# Patient Record
Sex: MALE | Race: WHITE | NOT HISPANIC OR LATINO | Employment: UNEMPLOYED | URBAN - NONMETROPOLITAN AREA
[De-identification: names, ages, dates, MRNs, and addresses within clinical notes are randomized per-mention and may not be internally consistent; named-entity substitution may affect disease eponyms.]

---

## 2018-08-19 ENCOUNTER — HOSPITAL ENCOUNTER (EMERGENCY)
Facility: HOSPITAL | Age: 38
Discharge: HOME | End: 2018-08-19
Attending: EMERGENCY MEDICINE | Admitting: EMERGENCY MEDICINE

## 2018-08-19 VITALS
DIASTOLIC BLOOD PRESSURE: 80 MMHG | BODY MASS INDEX: 21.97 KG/M2 | RESPIRATION RATE: 18 BRPM | SYSTOLIC BLOOD PRESSURE: 127 MMHG | TEMPERATURE: 98 F | OXYGEN SATURATION: 97 % | HEART RATE: 82 BPM | HEIGHT: 67 IN | WEIGHT: 140 LBS

## 2018-08-19 DIAGNOSIS — L03.114 CELLULITIS OF LEFT UPPER EXTREMITY: Primary | ICD-10-CM

## 2018-08-19 PROCEDURE — 25010000002 TDAP 5-2.5-18.5 LF-MCG/0.5 SUSPENSION: Performed by: EMERGENCY MEDICINE

## 2018-08-19 PROCEDURE — 90471 IMMUNIZATION ADMIN: CPT | Performed by: EMERGENCY MEDICINE

## 2018-08-19 PROCEDURE — 90715 TDAP VACCINE 7 YRS/> IM: CPT | Performed by: EMERGENCY MEDICINE

## 2018-08-19 PROCEDURE — 99283 EMERGENCY DEPT VISIT LOW MDM: CPT

## 2018-08-19 RX ORDER — CEPHALEXIN 500 MG/1
1000 CAPSULE ORAL ONCE
Status: COMPLETED | OUTPATIENT
Start: 2018-08-19 | End: 2018-08-19

## 2018-08-19 RX ORDER — CEPHALEXIN 500 MG/1
1000 CAPSULE ORAL 2 TIMES DAILY
Qty: 28 CAPSULE | Refills: 0 | Status: SHIPPED | OUTPATIENT
Start: 2018-08-19 | End: 2019-09-09

## 2018-08-19 RX ADMIN — CEPHALEXIN 1000 MG: 500 CAPSULE ORAL at 12:06

## 2018-08-19 RX ADMIN — TETANUS TOXOID, REDUCED DIPHTHERIA TOXOID AND ACELLULAR PERTUSSIS VACCINE, ADSORBED 0.5 ML: 5; 2.5; 8; 8; 2.5 SUSPENSION INTRAMUSCULAR at 12:44

## 2018-08-19 NOTE — ED NOTES
Patient presented to ED with laceration to left forearm from Friday night, reports redness, patient used super glue for closure. Drainage noted, redness and swelling around wound.     Alma Cam, LPN  08/19/18 1130

## 2018-08-19 NOTE — DISCHARGE INSTRUCTIONS
Cleanse the wound twice daily with hydrogen peroxide solution.  Gauze dressing as needed for drainage.  Follow-up in 3-5 days for wound recheck and return sooner with any new or worsening symptoms or any concerns.

## 2018-08-19 NOTE — ED PROVIDER NOTES
Subjective   Patient presents with a left forearm laceration.  Patient notes that he had cut this on a bedpost approximately 3 days ago.  Patient tried to self medicate by putting superglue in the wound.  This is cause some redness around the area approximately 2 cm with one area of streaking up the arm.  This is developed the last 12 hours.  Patient has not had systemic symptoms of nausea vomiting diarrhea fevers chills.  No weakness.  Patient still has full mobility arm 2+ distal pulses and less than 2 second capillary refill.            Review of Systems   Constitutional: Negative.  Negative for appetite change, chills and fever.   HENT: Negative.  Negative for congestion.    Eyes: Negative.  Negative for photophobia and visual disturbance.   Respiratory: Negative.  Negative for cough, chest tightness and shortness of breath.    Cardiovascular: Negative.  Negative for chest pain and palpitations.   Gastrointestinal: Negative.  Negative for abdominal pain, constipation, diarrhea, nausea and vomiting.   Endocrine: Negative.    Genitourinary: Negative.  Negative for decreased urine volume, dysuria, flank pain and hematuria.   Musculoskeletal: Negative.  Negative for arthralgias, back pain, myalgias, neck pain and neck stiffness.   Skin: Negative.  Negative for pallor.   Neurological: Negative.  Negative for dizziness, syncope, weakness, light-headedness, numbness and headaches.   Psychiatric/Behavioral: Negative.  Negative for confusion and suicidal ideas. The patient is not nervous/anxious.    All other systems reviewed and are negative.      History reviewed. No pertinent past medical history.    No Known Allergies    History reviewed. No pertinent surgical history.    History reviewed. No pertinent family history.    Social History     Social History   • Marital status: Single     Social History Main Topics   • Smoking status: Current Every Day Smoker     Packs/day: 1.00     Types: Cigarettes   • Alcohol use Yes       Comment: every couple of days   • Drug use: No     Other Topics Concern   • Not on file           Objective   Physical Exam   Constitutional: She is oriented to person, place, and time. She appears well-developed and well-nourished. No distress.   HENT:   Head: Normocephalic and atraumatic.   Nose: Nose normal.   Mouth/Throat: Oropharynx is clear and moist.   Eyes: Conjunctivae and EOM are normal. No scleral icterus.   Neck: Normal range of motion. Neck supple. No JVD present.   Cardiovascular: Normal rate, regular rhythm, normal heart sounds and intact distal pulses.  Exam reveals no gallop and no friction rub.    No murmur heard.  Pulmonary/Chest: Effort normal. No respiratory distress. She has no wheezes. She has no rales. She exhibits no tenderness.   Abdominal: Soft. She exhibits no distension and no mass. There is no tenderness. There is no rebound and no guarding.   Musculoskeletal: Normal range of motion. She exhibits tenderness. She exhibits no edema or deformity.   Patient has a 3 cm laceration to the lateral aspect of the volar forearm.  There is a 2 cm diameter area of erythema around this laceration with 1.  Erythema extending up the medial arm.  This is not the a palpable cord.  There is mild tenderness in this region mild warmth.  There is no drainage from the wound at this time.  There is crusted glued the wound though it is open at this point in several areas without purulent drainage.   Lymphadenopathy:     She has no cervical adenopathy.   Neurological: She is alert and oriented to person, place, and time. No cranial nerve deficit. She exhibits normal muscle tone.   Skin: Skin is warm and dry. No rash noted. She is not diaphoretic. No erythema. No pallor.   Psychiatric: She has a normal mood and affect. Her behavior is normal. Judgment and thought content normal.   Nursing note and vitals reviewed.      Procedures           ED Course      Patient with evidence of developing cellulitis in the  left upper extremity.  Patient placed on antibiotic and the interval wound follow-up.            [facility]      Final diagnoses:   Cellulitis of left upper extremity            Marcos Palacios MD  08/19/18 3071

## 2019-09-09 ENCOUNTER — OFFICE VISIT (OUTPATIENT)
Dept: ORTHOPEDIC SURGERY | Facility: CLINIC | Age: 39
End: 2019-09-09

## 2019-09-09 VITALS — BODY MASS INDEX: 24.33 KG/M2 | HEIGHT: 67 IN | WEIGHT: 155 LBS

## 2019-09-09 DIAGNOSIS — M25.572 ACUTE LEFT ANKLE PAIN: Primary | ICD-10-CM

## 2019-09-09 DIAGNOSIS — S82.62XA CLOSED DISPLACED FRACTURE OF LATERAL MALLEOLUS OF LEFT FIBULA, INITIAL ENCOUNTER: ICD-10-CM

## 2019-09-09 PROCEDURE — 99203 OFFICE O/P NEW LOW 30 MIN: CPT | Performed by: ORTHOPAEDIC SURGERY

## 2019-09-09 RX ORDER — BUPIVACAINE HCL/0.9 % NACL/PF 0.1 %
2 PLASTIC BAG, INJECTION (ML) EPIDURAL ONCE
Status: CANCELLED | OUTPATIENT
Start: 2019-09-12 | End: 2019-09-09

## 2019-09-09 NOTE — H&P (VIEW-ONLY)
Roland Guillory is a 39 y.o. male   Primary provider:  Provider, No Known       Chief Complaint   Patient presents with   • Left Ankle - Ankle Injury       HISTORY OF PRESENT ILLNESS: patient injured left ankle on 9/5/2019, stepped wrong off steps. Patient was seen at [facility] first had xrays done. Patient was given rx for norco (12).   Placed in Ace wrap and referred here  Ankle Injury    The incident occurred 3 to 5 days ago. The injury mechanism was a fall. The pain is present in the left ankle. The quality of the pain is described as stabbing. The pain is severe. The pain has been constant since onset. Associated symptoms comments: Swelling, redness, bruising. . He reports no foreign bodies present. The symptoms are aggravated by weight bearing (standing, sitting, driving, walking. ). He has tried rest for the symptoms.        CONCURRENT MEDICAL HISTORY:    History reviewed. No pertinent past medical history.    No Known Allergies    No current outpatient medications on file.    History reviewed. No pertinent surgical history.    History reviewed. No pertinent family history.    Social History     Socioeconomic History   • Marital status: Single     Spouse name: Not on file   • Number of children: Not on file   • Years of education: Not on file   • Highest education level: Not on file   Tobacco Use   • Smoking status: Current Every Day Smoker     Packs/day: 1.00     Types: Cigarettes   • Smokeless tobacco: Never Used   Substance and Sexual Activity   • Alcohol use: Yes     Comment: every couple of days   • Drug use: No   • Sexual activity: Defer        Review of Systems   Constitutional: Positive for activity change. Negative for chills and fever.   HENT: Negative.  Negative for facial swelling.    Eyes: Negative.  Negative for photophobia.   Respiratory: Negative.  Negative for apnea and shortness of breath.    Cardiovascular: Negative.  Negative for chest pain and leg swelling.   Gastrointestinal: Negative.  " Negative for abdominal pain, nausea and vomiting.   Endocrine: Negative.    Genitourinary: Negative.  Negative for dysuria.   Musculoskeletal: Positive for arthralgias, gait problem and joint swelling.   Skin: Negative.  Negative for color change and rash.   Allergic/Immunologic: Negative.    Neurological: Negative for seizures and syncope.   Hematological: Negative.    Psychiatric/Behavioral: Negative.  Negative for behavioral problems and dysphoric mood.         PHYSICAL EXAMINATION:       Ht 170.2 cm (67\")   Wt 70.3 kg (155 lb)   BMI 24.28 kg/m²     Physical Exam   Constitutional: He is oriented to person, place, and time. He appears well-developed.   HENT:   Head: Normocephalic and atraumatic.   Eyes: EOM are normal. Pupils are equal, round, and reactive to light.   Neck: Neck supple. No tracheal deviation present.   Pulmonary/Chest: Effort normal.   Musculoskeletal: He exhibits edema and tenderness. He exhibits no deformity.   Neurological: He is alert and oriented to person, place, and time.   Skin: Skin is warm and dry. No erythema.   Psychiatric: He has a normal mood and affect.       GAIT:     []  Normal  []  Antalgic    Assistive device: []  None  []  Walker     [x]  Crutches  []  Cane     []  Wheelchair  []  Stretcher    Ortho Exam  Swollen tender with lateral ecchymosis.  Not particular tender medially.  He is got small little bug bites that are noninfected on the anterior tibia.  He is neurovascular intact.        No results found.   3 views show a lateral malleolar fracture displaced 2 mm on the mortise and on lateral view.      ASSESSMENT:    Diagnoses and all orders for this visit:    Acute left ankle pain    Closed displaced fracture of lateral malleolus of left fibula, initial encounter          PLAN of gone over the x-rays with he and his wife.  He has about 2 mm of displacement otherwise active gentleman is on his feet all day.  Will recommend open reduction internal fixation.  The risk " benefits are discussed including but not limited to bleeding, blood clot, infection, need for further surgery, posttraumatic arthritis, malunion, nonunion, medical anesthetic complications, neurovascular injury, etc. he understands glad to proceed as planned and get it done this week.  No Follow-up on file.      This document has been electronically signed by Silvestre Echeverria MD on September 9, 2019 5:07 PM

## 2019-09-09 NOTE — PROGRESS NOTES
Roland Guillory is a 39 y.o. male   Primary provider:  Provider, No Known       Chief Complaint   Patient presents with   • Left Ankle - Ankle Injury       HISTORY OF PRESENT ILLNESS: patient injured left ankle on 9/5/2019, stepped wrong off steps. Patient was seen at [facility] first had xrays done. Patient was given rx for norco (12).   Placed in Ace wrap and referred here  Ankle Injury    The incident occurred 3 to 5 days ago. The injury mechanism was a fall. The pain is present in the left ankle. The quality of the pain is described as stabbing. The pain is severe. The pain has been constant since onset. Associated symptoms comments: Swelling, redness, bruising. . He reports no foreign bodies present. The symptoms are aggravated by weight bearing (standing, sitting, driving, walking. ). He has tried rest for the symptoms.        CONCURRENT MEDICAL HISTORY:    History reviewed. No pertinent past medical history.    No Known Allergies    No current outpatient medications on file.    History reviewed. No pertinent surgical history.    History reviewed. No pertinent family history.    Social History     Socioeconomic History   • Marital status: Single     Spouse name: Not on file   • Number of children: Not on file   • Years of education: Not on file   • Highest education level: Not on file   Tobacco Use   • Smoking status: Current Every Day Smoker     Packs/day: 1.00     Types: Cigarettes   • Smokeless tobacco: Never Used   Substance and Sexual Activity   • Alcohol use: Yes     Comment: every couple of days   • Drug use: No   • Sexual activity: Defer        Review of Systems   Constitutional: Positive for activity change. Negative for chills and fever.   HENT: Negative.  Negative for facial swelling.    Eyes: Negative.  Negative for photophobia.   Respiratory: Negative.  Negative for apnea and shortness of breath.    Cardiovascular: Negative.  Negative for chest pain and leg swelling.   Gastrointestinal: Negative.  " Negative for abdominal pain, nausea and vomiting.   Endocrine: Negative.    Genitourinary: Negative.  Negative for dysuria.   Musculoskeletal: Positive for arthralgias, gait problem and joint swelling.   Skin: Negative.  Negative for color change and rash.   Allergic/Immunologic: Negative.    Neurological: Negative for seizures and syncope.   Hematological: Negative.    Psychiatric/Behavioral: Negative.  Negative for behavioral problems and dysphoric mood.         PHYSICAL EXAMINATION:       Ht 170.2 cm (67\")   Wt 70.3 kg (155 lb)   BMI 24.28 kg/m²     Physical Exam   Constitutional: He is oriented to person, place, and time. He appears well-developed.   HENT:   Head: Normocephalic and atraumatic.   Eyes: EOM are normal. Pupils are equal, round, and reactive to light.   Neck: Neck supple. No tracheal deviation present.   Pulmonary/Chest: Effort normal.   Musculoskeletal: He exhibits edema and tenderness. He exhibits no deformity.   Neurological: He is alert and oriented to person, place, and time.   Skin: Skin is warm and dry. No erythema.   Psychiatric: He has a normal mood and affect.       GAIT:     []  Normal  []  Antalgic    Assistive device: []  None  []  Walker     [x]  Crutches  []  Cane     []  Wheelchair  []  Stretcher    Ortho Exam  Swollen tender with lateral ecchymosis.  Not particular tender medially.  He is got small little bug bites that are noninfected on the anterior tibia.  He is neurovascular intact.        No results found.   3 views show a lateral malleolar fracture displaced 2 mm on the mortise and on lateral view.      ASSESSMENT:    Diagnoses and all orders for this visit:    Acute left ankle pain    Closed displaced fracture of lateral malleolus of left fibula, initial encounter          PLAN of gone over the x-rays with he and his wife.  He has about 2 mm of displacement otherwise active gentleman is on his feet all day.  Will recommend open reduction internal fixation.  The risk " benefits are discussed including but not limited to bleeding, blood clot, infection, need for further surgery, posttraumatic arthritis, malunion, nonunion, medical anesthetic complications, neurovascular injury, etc. he understands glad to proceed as planned and get it done this week.  No Follow-up on file.      This document has been electronically signed by Silvestre Echeverria MD on September 9, 2019 5:07 PM

## 2019-09-11 ENCOUNTER — TELEPHONE (OUTPATIENT)
Dept: ORTHOPEDIC SURGERY | Facility: CLINIC | Age: 39
End: 2019-09-11

## 2019-09-12 ENCOUNTER — HOSPITAL ENCOUNTER (OUTPATIENT)
Facility: HOSPITAL | Age: 39
Setting detail: HOSPITAL OUTPATIENT SURGERY
Discharge: HOME | End: 2019-09-12
Attending: ORTHOPAEDIC SURGERY | Admitting: ORTHOPAEDIC SURGERY

## 2019-09-12 ENCOUNTER — ANESTHESIA EVENT (OUTPATIENT)
Dept: PERIOP | Facility: HOSPITAL | Age: 39
End: 2019-09-12

## 2019-09-12 ENCOUNTER — ANESTHESIA (OUTPATIENT)
Dept: PERIOP | Facility: HOSPITAL | Age: 39
End: 2019-09-12

## 2019-09-12 ENCOUNTER — APPOINTMENT (OUTPATIENT)
Dept: GENERAL RADIOLOGY | Facility: HOSPITAL | Age: 39
End: 2019-09-12

## 2019-09-12 VITALS
SYSTOLIC BLOOD PRESSURE: 122 MMHG | BODY MASS INDEX: 25.05 KG/M2 | WEIGHT: 159.61 LBS | OXYGEN SATURATION: 100 % | RESPIRATION RATE: 18 BRPM | TEMPERATURE: 97.3 F | DIASTOLIC BLOOD PRESSURE: 78 MMHG | HEIGHT: 67 IN | HEART RATE: 68 BPM

## 2019-09-12 DIAGNOSIS — S82.62XA CLOSED DISPLACED FRACTURE OF LATERAL MALLEOLUS OF LEFT FIBULA, INITIAL ENCOUNTER: ICD-10-CM

## 2019-09-12 PROCEDURE — 25010000002 FENTANYL CITRATE (PF) 100 MCG/2ML SOLUTION: Performed by: NURSE ANESTHETIST, CERTIFIED REGISTERED

## 2019-09-12 PROCEDURE — C1713 ANCHOR/SCREW BN/BN,TIS/BN: HCPCS | Performed by: ORTHOPAEDIC SURGERY

## 2019-09-12 PROCEDURE — 25010000002 DEXAMETHASONE PER 1 MG: Performed by: NURSE ANESTHETIST, CERTIFIED REGISTERED

## 2019-09-12 PROCEDURE — 25010000002 PROPOFOL 10 MG/ML EMULSION: Performed by: NURSE ANESTHETIST, CERTIFIED REGISTERED

## 2019-09-12 PROCEDURE — 25010000002 MIDAZOLAM PER 1 MG: Performed by: NURSE ANESTHETIST, CERTIFIED REGISTERED

## 2019-09-12 PROCEDURE — 27792 TREATMENT OF ANKLE FRACTURE: CPT | Performed by: ORTHOPAEDIC SURGERY

## 2019-09-12 PROCEDURE — 76000 FLUOROSCOPY <1 HR PHYS/QHP: CPT

## 2019-09-12 PROCEDURE — 25010000002 ONDANSETRON PER 1 MG: Performed by: NURSE ANESTHETIST, CERTIFIED REGISTERED

## 2019-09-12 DEVICE — SCRW CANC FUL/THRD 4.0X16MM: Type: IMPLANTABLE DEVICE | Site: ANKLE | Status: FUNCTIONAL

## 2019-09-12 DEVICE — SCRW CORT S/TAP 3.5X12MM: Type: IMPLANTABLE DEVICE | Site: ANKLE | Status: FUNCTIONAL

## 2019-09-12 DEVICE — SCRW CORT S/TAP 3.5X24MM: Type: IMPLANTABLE DEVICE | Site: ANKLE | Status: FUNCTIONAL

## 2019-09-12 DEVICE — PLT TBG 1/3 W COL 6HL 73MM: Type: IMPLANTABLE DEVICE | Site: ANKLE | Status: FUNCTIONAL

## 2019-09-12 RX ORDER — BUPIVACAINE HCL/0.9 % NACL/PF 0.1 %
2 PLASTIC BAG, INJECTION (ML) EPIDURAL ONCE
Status: COMPLETED | OUTPATIENT
Start: 2019-09-12 | End: 2019-09-12

## 2019-09-12 RX ORDER — SODIUM CHLORIDE, SODIUM GLUCONATE, SODIUM ACETATE, POTASSIUM CHLORIDE, AND MAGNESIUM CHLORIDE 526; 502; 368; 37; 30 MG/100ML; MG/100ML; MG/100ML; MG/100ML; MG/100ML
1000 INJECTION, SOLUTION INTRAVENOUS CONTINUOUS
Status: DISCONTINUED | OUTPATIENT
Start: 2019-09-12 | End: 2019-09-12 | Stop reason: HOSPADM

## 2019-09-12 RX ORDER — FENTANYL CITRATE 50 UG/ML
INJECTION, SOLUTION INTRAMUSCULAR; INTRAVENOUS AS NEEDED
Status: DISCONTINUED | OUTPATIENT
Start: 2019-09-12 | End: 2019-09-12 | Stop reason: SURG

## 2019-09-12 RX ORDER — LIDOCAINE HYDROCHLORIDE 20 MG/ML
INJECTION, SOLUTION EPIDURAL; INFILTRATION; INTRACAUDAL; PERINEURAL AS NEEDED
Status: DISCONTINUED | OUTPATIENT
Start: 2019-09-12 | End: 2019-09-12 | Stop reason: SURG

## 2019-09-12 RX ORDER — MEPERIDINE HYDROCHLORIDE 50 MG/ML
50 INJECTION INTRAMUSCULAR; INTRAVENOUS; SUBCUTANEOUS ONCE
Status: DISCONTINUED | OUTPATIENT
Start: 2019-09-12 | End: 2019-09-12

## 2019-09-12 RX ORDER — PROPOFOL 10 MG/ML
VIAL (ML) INTRAVENOUS AS NEEDED
Status: DISCONTINUED | OUTPATIENT
Start: 2019-09-12 | End: 2019-09-12 | Stop reason: SURG

## 2019-09-12 RX ORDER — ONDANSETRON 2 MG/ML
INJECTION INTRAMUSCULAR; INTRAVENOUS AS NEEDED
Status: DISCONTINUED | OUTPATIENT
Start: 2019-09-12 | End: 2019-09-12 | Stop reason: SURG

## 2019-09-12 RX ORDER — BACITRACIN 50000 [IU]/1
INJECTION, POWDER, FOR SOLUTION INTRAMUSCULAR AS NEEDED
Status: DISCONTINUED | OUTPATIENT
Start: 2019-09-12 | End: 2019-09-12 | Stop reason: HOSPADM

## 2019-09-12 RX ORDER — NAPROXEN SODIUM 220 MG
220 TABLET ORAL 2 TIMES DAILY PRN
COMMUNITY

## 2019-09-12 RX ORDER — OXYCODONE HYDROCHLORIDE AND ACETAMINOPHEN 5; 325 MG/1; MG/1
1-2 TABLET ORAL EVERY 4 HOURS PRN
Qty: 30 TABLET | Refills: 0 | Status: SHIPPED | OUTPATIENT
Start: 2019-09-12 | End: 2019-09-23

## 2019-09-12 RX ORDER — 0.9 % SODIUM CHLORIDE 0.9 %
VIAL (ML) INJECTION AS NEEDED
Status: DISCONTINUED | OUTPATIENT
Start: 2019-09-12 | End: 2019-09-12 | Stop reason: HOSPADM

## 2019-09-12 RX ORDER — MIDAZOLAM HYDROCHLORIDE 1 MG/ML
INJECTION INTRAMUSCULAR; INTRAVENOUS AS NEEDED
Status: DISCONTINUED | OUTPATIENT
Start: 2019-09-12 | End: 2019-09-12 | Stop reason: SURG

## 2019-09-12 RX ORDER — DEXAMETHASONE SODIUM PHOSPHATE 4 MG/ML
INJECTION, SOLUTION INTRA-ARTICULAR; INTRALESIONAL; INTRAMUSCULAR; INTRAVENOUS; SOFT TISSUE AS NEEDED
Status: DISCONTINUED | OUTPATIENT
Start: 2019-09-12 | End: 2019-09-12 | Stop reason: SURG

## 2019-09-12 RX ADMIN — FENTANYL CITRATE 50 MCG: 50 INJECTION, SOLUTION INTRAMUSCULAR; INTRAVENOUS at 10:42

## 2019-09-12 RX ADMIN — LIDOCAINE HYDROCHLORIDE 50 MG: 20 INJECTION, SOLUTION EPIDURAL; INFILTRATION; INTRACAUDAL; PERINEURAL at 10:27

## 2019-09-12 RX ADMIN — MEPERIDINE HYDROCHLORIDE 12.5 MG: 25 INJECTION, SOLUTION INTRAMUSCULAR; INTRAVENOUS; SUBCUTANEOUS at 11:51

## 2019-09-12 RX ADMIN — SODIUM CHLORIDE, SODIUM GLUCONATE, SODIUM ACETATE, POTASSIUM CHLORIDE, AND MAGNESIUM CHLORIDE 1000 ML: 526; 502; 368; 37; 30 INJECTION, SOLUTION INTRAVENOUS at 08:34

## 2019-09-12 RX ADMIN — DEXAMETHASONE SODIUM PHOSPHATE 4 MG: 4 INJECTION, SOLUTION INTRAMUSCULAR; INTRAVENOUS at 10:40

## 2019-09-12 RX ADMIN — FENTANYL CITRATE 50 MCG: 50 INJECTION, SOLUTION INTRAMUSCULAR; INTRAVENOUS at 10:35

## 2019-09-12 RX ADMIN — MEPERIDINE HYDROCHLORIDE 12.5 MG: 25 INJECTION, SOLUTION INTRAMUSCULAR; INTRAVENOUS; SUBCUTANEOUS at 11:45

## 2019-09-12 RX ADMIN — FENTANYL CITRATE 50 MCG: 50 INJECTION, SOLUTION INTRAMUSCULAR; INTRAVENOUS at 10:58

## 2019-09-12 RX ADMIN — MIDAZOLAM HYDROCHLORIDE 2 MG: 2 INJECTION, SOLUTION INTRAMUSCULAR; INTRAVENOUS at 10:21

## 2019-09-12 RX ADMIN — PROPOFOL 200 MG: 10 INJECTION, EMULSION INTRAVENOUS at 10:27

## 2019-09-12 RX ADMIN — Medication 2 G: at 10:40

## 2019-09-12 RX ADMIN — ONDANSETRON 4 MG: 2 INJECTION INTRAMUSCULAR; INTRAVENOUS at 10:45

## 2019-09-12 NOTE — ANESTHESIA PREPROCEDURE EVALUATION
Anesthesia Evaluation     no history of anesthetic complications:  NPO Solid Status: > 8 hours  NPO Liquid Status: > 8 hours           Airway   Mallampati: II  Neck ROM: full  No difficulty expected  Dental    (+) poor dentition        Pulmonary - normal exam    breath sounds clear to auscultation  (+) a smoker (1 ppd) Current Abstained day of surgery,   (-) COPD, asthma, sleep apnea    ROS comment: snores  Cardiovascular - normal exam  Exercise tolerance: good (4-7 METS)    Rhythm: regular  Rate: normal    (-) hypertension, valvular problems/murmurs, dysrhythmias, angina, cardiac stents, DVT, hyperlipidemia      Neuro/Psych  (-) seizures, TIA, CVA, headaches, psychiatric history  GI/Hepatic/Renal/Endo    (+)  GERD well controlled,    (-) hepatitis, liver disease, no renal disease, diabetes, hypothyroidism    Musculoskeletal         ROS comment: Left fibula fracture  Abdominal    Substance History   (+) alcohol use (socially),   (-) drug use     OB/GYN          Other        (-) history of cancer                  Anesthesia Plan    ASA 2     general     intravenous induction   Anesthetic plan, all risks, benefits, and alternatives have been provided, discussed and informed consent has been obtained with: patient.

## 2019-09-12 NOTE — OP NOTE
Procedure(s):  ANKLE OPEN REDUCTION INTERNAL FIXATION  Procedure Note    Roland Guillory  9/12/2019    Pre-op Diagnosis:   Closed displaced fracture of lateral malleolus of left fibula, initial encounter [S82.62XA]    Post-op Diagnosis:     Post-Op Diagnosis Codes:     * Closed displaced fracture of lateral malleolus of left fibula, initial encounter [S82.62XA]    Procedure/CPT® Codes:      Procedure(s):  ANKLE OPEN REDUCTION INTERNAL FIXATION left    Surgeon(s):  Silvestre Echeverria MD    Anesthesia: General    Staff:   Circulator: Amari Aguilar RN  Radiology Technologist: Alicia Marquez  Scrub Person: Emily Diallo  Assistant: Mana Jose MA    Estimated Blood Loss: minimal    Specimens:                None      Drains:      Findings: Lateral malleolar fracture    Complications: None    Dictation: Indications: 39-year-old gentleman who fractured his ankle noted to have a displaced fracture proximal with 2 to 3 mm lateral malleolus risk benefits of surgery has been explained to him including but not limited to bleeding, blood clot, infection, malunion, nonunion, need for further surgery, neurovascular injury, medical anesthetic complications, etc. detailed informed consent is given we will go ahead and proceed as planned.    Procedure: After adequate anesthesia obtained patient's leg prepped and draped usual sterile fashion a surgical timeout was performed prior to procedure.  Tourniquet was inflated sharp dissection was carried over the lateral malleolus to persist carried to the skin with care to avoid neurovascular structures dissection then proceeded down to the bone for subperiosteal dissection was taken over the fracture site.  Fracture site is curetted of fracture hematoma irrigated) saline solution.    The fracture was then reduced and held with bone clamps while lag screws placed from anterior to posterior compressing the fracture.  A semitubular plate was then fashioned to the  distal fibula and held in place well fixed with the appropriate length cortical screws proximally and cancellus screws distally.  C-arm control was used to the procedure to confirm position of hardware and reduction of the fracture.  This was done in 3 planes and saved to the system.  After we are satisfied with alignment there is irrigated) saline solution fascia was closed with a combination of 0 Vicryl interrupted suture and subcutaneous tissue with 2-0 Vicryl.  Skin was closed with staples sterile dressings and a splint was applied he tolerated the procedure well without complications.    Silvestre Echeverria MD  09/12/19  12:01 PM

## 2019-09-12 NOTE — INTERVAL H&P NOTE
H&P reviewed. The patient was examined and there are no changes to the H&P.      Temp:  [98.4 °F (36.9 °C)] 98.4 °F (36.9 °C)  Heart Rate:  [64] 64  Resp:  [20] 20  BP: (114)/(79) 114/79    No Known Allergies    Prior to Admission medications    Medication Sig Start Date End Date Taking? Authorizing Provider   naproxen sodium (ALEVE) 220 MG tablet Take 220 mg by mouth 2 (Two) Times a Day As Needed.   Yes Provider, MD Eunice       Past Medical History:   Diagnosis Date   • Ankle fracture     left       History reviewed. No pertinent surgical history.    Social History     Socioeconomic History   • Marital status: Single     Spouse name: Not on file   • Number of children: Not on file   • Years of education: Not on file   • Highest education level: Not on file   Tobacco Use   • Smoking status: Current Every Day Smoker     Packs/day: 1.00     Types: Cigarettes   • Smokeless tobacco: Never Used   Substance and Sexual Activity   • Alcohol use: Yes     Comment: every couple of days   • Drug use: No   • Sexual activity: Defer       History reviewed. No pertinent family history.      Closed displaced fracture of lateral malleolus of left fibula      Review of Systems   HENT: Negative.    Respiratory: Negative.    Cardiovascular: Negative.    Gastrointestinal: Negative.    All other systems reviewed and are negative.

## 2019-09-12 NOTE — ANESTHESIA POSTPROCEDURE EVALUATION
Patient: Roland Guillory    Procedure Summary     Date:  09/12/19 Room / Location:  [facility] OR  / [facility] OR    Anesthesia Start:  1021 Anesthesia Stop:  1132    Procedure:  ANKLE OPEN REDUCTION INTERNAL FIXATION (Left Ankle) Diagnosis:       Closed displaced fracture of lateral malleolus of left fibula, initial encounter      (Closed displaced fracture of lateral malleolus of left fibula, initial encounter [S82.62XA])    Surgeon:  Silvestre Echeverria MD Provider:  Kevin Christina MD    Anesthesia Type:  general ASA Status:  2          Anesthesia Type: general  Last vitals  BP   114/79 (09/12/19 0823)   Temp   98.4 °F (36.9 °C) (09/12/19 0823)   Pulse   64 (09/12/19 0823)   Resp   20 (09/12/19 0823)     SpO2   97 % (09/12/19 0823)     Post Anesthesia Care and Evaluation    Patient location during evaluation: bedside  Patient participation: complete - patient cannot participate  Level of consciousness: awake  Pain score: 0  Pain management: adequate  Airway patency: patent  Anesthetic complications: No anesthetic complications  PONV Status: none  Cardiovascular status: acceptable  Respiratory status: acceptable  Hydration status: acceptable

## 2019-09-12 NOTE — ANESTHESIA PROCEDURE NOTES
Airway  Urgency: elective    Date/Time: 9/12/2019 10:28 AM  Airway not difficult    General Information and Staff    Patient location during procedure: OR  CRNA: Darien Pollock CRNA    Indications and Patient Condition  Indications for airway management: airway protection    Preoxygenated: yes  Mask difficulty assessment: 0 - not attempted    Final Airway Details  Final airway type: supraglottic airway      Successful airway: I-gel  Size 4    Cormack-Lehane Classification: grade I - full view of glottis  Number of attempts at approach: 1  Assessment: lips, teeth, and gum same as pre-op

## 2019-09-20 DIAGNOSIS — S82.62XA CLOSED DISPLACED FRACTURE OF LATERAL MALLEOLUS OF LEFT FIBULA, INITIAL ENCOUNTER: ICD-10-CM

## 2019-09-20 DIAGNOSIS — M25.572 ACUTE LEFT ANKLE PAIN: Primary | ICD-10-CM

## 2019-09-23 ENCOUNTER — OFFICE VISIT (OUTPATIENT)
Dept: ORTHOPEDIC SURGERY | Facility: CLINIC | Age: 39
End: 2019-09-23

## 2019-09-23 VITALS — WEIGHT: 160 LBS | HEIGHT: 67 IN | BODY MASS INDEX: 25.11 KG/M2

## 2019-09-23 DIAGNOSIS — M25.572 ACUTE LEFT ANKLE PAIN: Primary | ICD-10-CM

## 2019-09-23 DIAGNOSIS — S82.62XD CLOSED DISPLACED FRACTURE OF LATERAL MALLEOLUS OF LEFT FIBULA WITH ROUTINE HEALING, SUBSEQUENT ENCOUNTER: ICD-10-CM

## 2019-09-23 PROCEDURE — 99024 POSTOP FOLLOW-UP VISIT: CPT | Performed by: ORTHOPAEDIC SURGERY

## 2019-09-23 NOTE — PROGRESS NOTES
Roland Guillory is a 39 y.o. male is s/p       Chief Complaint   Patient presents with   • Left Ankle - Post-op       HISTORY OF PRESENT ILLNESS: postop left ankle, xrays done today, he is 2 weeks out today.  Doing very well minimal complaint    09/12/19 (11d) Silvestre Echeverria MD     ANKLE OPEN REDUCTION INTERNAL FIXATION - Left            No Known Allergies      Current Outpatient Medications:   •  naproxen sodium (ALEVE) 220 MG tablet, Take 220 mg by mouth 2 (Two) Times a Day As Needed., Disp: , Rfl:         PHYSICAL EXAMINATION:       Roland Guillory is a 39 y.o. male    Patient is awake and alert, answers questions appropriately and is in no apparent distress.    GAIT:     []  Normal  []  Antalgic    Assistive device: []  None  []  Walker     [x]  Crutches  []  Cane     []  Wheelchair  []  Stretcher    Ortho Exam  Wound looks good no sign of infection.  Minimal swelling.  Neurovascular intact.    No results found.  Review x-rays unchanged from C-arm film    ASSESSMENT:    Diagnoses and all orders for this visit:    Acute left ankle pain    Closed displaced fracture of lateral malleolus of left fibula with routine healing, subsequent encounter          PLAN suture removal, Steri-Strip.  Walker boot with nonweightbearing.  Follow-up x-ray on arrival in 3 weeks.              This document has been electronically signed by Silvestre Echeverria MD on September 23, 2019 12:23 PM

## 2019-10-10 DIAGNOSIS — M25.572 ACUTE LEFT ANKLE PAIN: Primary | ICD-10-CM

## 2019-10-10 DIAGNOSIS — S82.62XD CLOSED DISPLACED FRACTURE OF LATERAL MALLEOLUS OF LEFT FIBULA WITH ROUTINE HEALING, SUBSEQUENT ENCOUNTER: ICD-10-CM

## 2019-10-14 ENCOUNTER — OFFICE VISIT (OUTPATIENT)
Dept: ORTHOPEDIC SURGERY | Facility: CLINIC | Age: 39
End: 2019-10-14

## 2019-10-14 VITALS — BODY MASS INDEX: 26.21 KG/M2 | WEIGHT: 167 LBS | HEIGHT: 67 IN

## 2019-10-14 DIAGNOSIS — S82.62XD CLOSED DISPLACED FRACTURE OF LATERAL MALLEOLUS OF LEFT FIBULA WITH ROUTINE HEALING, SUBSEQUENT ENCOUNTER: Primary | ICD-10-CM

## 2019-10-14 PROBLEM — S82.63XD CLOSED DISPLACED FRACTURE OF LATERAL MALLEOLUS OF FIBULA WITH ROUTINE HEALING: Status: ACTIVE | Noted: 2019-09-09

## 2019-10-14 PROCEDURE — 99024 POSTOP FOLLOW-UP VISIT: CPT | Performed by: ORTHOPAEDIC SURGERY

## 2019-10-14 NOTE — PROGRESS NOTES
Roland Guillory is a 39 y.o. male is s/p       Chief Complaint   Patient presents with   • Left Ankle - Follow-up, Fracture       HISTORY OF PRESENT ILLNESS: f/u left ankle, repeat xrays done today. Patient wearing walking boot.     09/12/19 (32d) Silvestre Echeverria MD     ANKLE OPEN REDUCTION INTERNAL FIXATION - Left            No Known Allergies      Current Outpatient Medications:   •  naproxen sodium (ALEVE) 220 MG tablet, Take 220 mg by mouth 2 (Two) Times a Day As Needed., Disp: , Rfl:         PHYSICAL EXAMINATION:       Roland Guillory is a 39 y.o. male    Patient is awake and alert, answers questions appropriately and is in no apparent distress.    GAIT:     []  Normal  []  Antalgic    Assistive device: []  None  []  Walker     [x]  Crutches  []  Cane     []  Wheelchair  []  Stretcher    Ortho Exam  Wound looks good.  Minimal swelling neurovascular intact pretty good motion    Xr Ankle 3+ View Left    Result Date: 9/28/2019  Narrative: 3 views left ankle Comparison intraoperative films Patient has had open reduction internal fixation of lateral malleolus in anatomic position.  No change from prior films Impression: Status post ORIF lateral malleolus left    Review x-rays look good without change      ASSESSMENT:    Diagnoses and all orders for this visit:    Closed displaced fracture of lateral malleolus of left fibula with routine healing, subsequent encounter          PLAN continue nonweightbearing for 10 days.  At that time progressively weight-bear.  He will work on range of motion exercises now.  X-ray on arrival in 3 weeks.                This document has been electronically signed by Cyndi Jenkins MA on October 14, 2019 8:05 AM

## 2019-11-01 DIAGNOSIS — S82.62XD CLOSED DISPLACED FRACTURE OF LATERAL MALLEOLUS OF LEFT FIBULA WITH ROUTINE HEALING, SUBSEQUENT ENCOUNTER: Primary | ICD-10-CM

## 2019-11-04 ENCOUNTER — OFFICE VISIT (OUTPATIENT)
Dept: ORTHOPEDIC SURGERY | Facility: CLINIC | Age: 39
End: 2019-11-04

## 2019-11-04 VITALS — WEIGHT: 167.2 LBS | HEIGHT: 67 IN | BODY MASS INDEX: 26.24 KG/M2

## 2019-11-04 DIAGNOSIS — S82.62XD CLOSED DISPLACED FRACTURE OF LATERAL MALLEOLUS OF LEFT FIBULA WITH ROUTINE HEALING, SUBSEQUENT ENCOUNTER: Primary | ICD-10-CM

## 2019-11-04 PROCEDURE — 99024 POSTOP FOLLOW-UP VISIT: CPT | Performed by: ORTHOPAEDIC SURGERY

## 2019-11-04 NOTE — PROGRESS NOTES
Roland Guillory is a 39 y.o. male is s/p  ANKLE OPEN REDUCTION INTERNAL FIXATION - Left     Chief Complaint   Patient presents with   • Left Ankle - Follow-up       HISTORY OF PRESENT ILLNESS: Patient is here today for recheck of left ankle. Patient had ORIF of left ankle on 9/12/2019. Patient was sent to xray upon arrival. He states that he has irritation when he is weight bearing.  8 weeks out doing pretty well.       No Known Allergies      Current Outpatient Medications:   •  naproxen sodium (ALEVE) 220 MG tablet, Take 220 mg by mouth 2 (Two) Times a Day As Needed., Disp: , Rfl:         PHYSICAL EXAMINATION:       Roland Guillory is a 39 y.o. male    Patient is awake and alert, answers questions appropriately and is in no apparent distress.    GAIT:     []  Normal  []  Antalgic    Assistive device: [x]  None  []  Walker     []  Crutches  []  Cane     []  Wheelchair  []  Stretcher    Ortho Exam  Wound looks good.  Not tender laterally.  Moderate swelling.  Xr Ankle 3+ View Left    Result Date: 10/14/2019  Narrative: 3 views left ankle Comparison prior film Again seen patient status post ORIF of lateral malleolar fracture without complicating features.  Fracture line is not visualized. Impression: Healing fracture lateral malleolus status post ORIF    [facility] x-rays are unchanged from 3 views    Body mass index is 26.19 kg/m².  ASSESSMENT:    Diagnoses and all orders for this visit:    Closed displaced fracture of lateral malleolus of left fibula with routine healing, subsequent encounter          PLAN aggressive weightbearing.  Thera-Band exercises.  Of caution about proprioception and exercises to improve this.  Will call with any problems whatsoever.  I have told him that would continue to swell likely for 6 months or so                This document has been electronically signed by Mana Jose MA on November 4, 2019 8:21 AM

## (undated) DEVICE — PAD UNDERCAST WYTEX 4IN 4YD LF STRL

## (undated) DEVICE — BNDG ELAS CO-FLEX SLF ADHR 4IN5YD LF STRL

## (undated) DEVICE — UNDRPD BREATH 23X36 BG/10

## (undated) DEVICE — SCRW CORT S/TAP 3.5X14MM
Type: IMPLANTABLE DEVICE | Site: ANKLE | Status: NON-FUNCTIONAL
Removed: 2019-09-12

## (undated) DEVICE — PK EXTRM LF 60

## (undated) DEVICE — GLV SURG TRIUMPH LT PF LTX 6 STRL

## (undated) DEVICE — BIT DRL QC DIA 3.5X110MM

## (undated) DEVICE — SCRW CANC FUL/THRD 4.0X18MM
Type: IMPLANTABLE DEVICE | Site: ANKLE | Status: NON-FUNCTIONAL
Removed: 2019-09-12

## (undated) DEVICE — SUT VIC 0 CT1 CR8 27IN JJ41G

## (undated) DEVICE — GOWN,AURORA,NOREINF,RAGLAN,XL,STERILE: Brand: MEDLINE

## (undated) DEVICE — BNDG ELAS ELITE V/CLOSE 6IN 5YD LF STRL

## (undated) DEVICE — CVR FLUOROSCOPE C/ARM W/TP 36X28IN

## (undated) DEVICE — SOL IRR NACL 0.9PCT BT 1000ML

## (undated) DEVICE — BNDG ELAS ELITE V/CLOSE 4IN 5YD LF STRL

## (undated) DEVICE — GOWN,PREVENTION PLUS,XLONG/XLARGE,STRL: Brand: MEDLINE

## (undated) DEVICE — SUT VIC 2/0 CT1 CR8 18IN J839D

## (undated) DEVICE — SPLNT ORTHOGLASS P/C 34X30IN LF

## (undated) DEVICE — GLV SURG SENSICARE POLYISPRN W/ALOE PF LF 6.5 GRN STRL

## (undated) DEVICE — DRAPE,U/ SHT,SPLIT,PLAS,STERIL: Brand: MEDLINE

## (undated) DEVICE — STPLR SKIN VISISTAT WD 35CT

## (undated) DEVICE — SPLNT ORTHOGLASS P/C 3X35IN LF

## (undated) DEVICE — PAD UNDERCAST WYTEX 6IN 4YD LF STRL

## (undated) DEVICE — GLV SURG ORTHO BIOGEL OPTIFIT PF SZ9

## (undated) DEVICE — BIT DRL QC DIA 2.5X110MM

## (undated) DEVICE — STERILE POLYISOPRENE POWDER-FREE SURGICAL GLOVES WITH EMOLLIENT COATING: Brand: PROTEXIS

## (undated) DEVICE — GAUZE,SPONGE,FLUFF,6"X6.75",STRL,5/TRAY: Brand: MEDLINE

## (undated) DEVICE — DISPOSABLE TOURNIQUET CUFF SINGLE BLADDER, DUAL PORT AND QUICK CONNECT CONNECTOR: Brand: COLOR CUFF

## (undated) DEVICE — DRSNG GZ CURAD XEROFORM NONADHS 5X9IN STRL